# Patient Record
Sex: FEMALE | Race: OTHER | HISPANIC OR LATINO
[De-identification: names, ages, dates, MRNs, and addresses within clinical notes are randomized per-mention and may not be internally consistent; named-entity substitution may affect disease eponyms.]

---

## 2023-08-05 ENCOUNTER — ASOB RESULT (OUTPATIENT)
Age: 38
End: 2023-08-05

## 2023-08-05 ENCOUNTER — APPOINTMENT (OUTPATIENT)
Dept: ANTEPARTUM | Facility: CLINIC | Age: 38
End: 2023-08-05
Payer: COMMERCIAL

## 2023-08-05 ENCOUNTER — OUTPATIENT (OUTPATIENT)
Dept: INPATIENT UNIT | Facility: HOSPITAL | Age: 38
LOS: 1 days | Discharge: HOME | End: 2023-08-05
Payer: COMMERCIAL

## 2023-08-05 VITALS
DIASTOLIC BLOOD PRESSURE: 68 MMHG | SYSTOLIC BLOOD PRESSURE: 107 MMHG | TEMPERATURE: 98 F | HEART RATE: 75 BPM | RESPIRATION RATE: 16 BRPM

## 2023-08-05 VITALS — DIASTOLIC BLOOD PRESSURE: 64 MMHG | SYSTOLIC BLOOD PRESSURE: 108 MMHG | HEART RATE: 67 BPM

## 2023-08-05 DIAGNOSIS — O26.899 OTHER SPECIFIED PREGNANCY RELATED CONDITIONS, UNSPECIFIED TRIMESTER: ICD-10-CM

## 2023-08-05 PROCEDURE — 76815 OB US LIMITED FETUS(S): CPT | Mod: 26

## 2023-08-05 PROCEDURE — 76819 FETAL BIOPHYS PROFIL W/O NST: CPT | Mod: 26

## 2023-08-05 PROCEDURE — 83986 ASSAY PH BODY FLUID NOS: CPT | Mod: QW

## 2023-08-05 PROCEDURE — 99221 1ST HOSP IP/OBS SF/LOW 40: CPT | Mod: 25

## 2023-08-05 PROCEDURE — 59025 FETAL NON-STRESS TEST: CPT | Mod: 26

## 2023-08-05 NOTE — OB RN TRIAGE NOTE - NS_OBGYNHISTORY_OBGYN_ALL_OB_FT
[facility] 2022
PAST SURGICAL HISTORY:  No significant past surgical history

## 2023-08-05 NOTE — OB PROVIDER TRIAGE NOTE - NSHPPHYSICALEXAM_GEN_ALL_CORE
Vital Signs Last 24 Hrs  T(C): 36.7 (05 Aug 2023 09:53), Max: 36.7 (05 Aug 2023 09:53)  T(F): 98.1 (05 Aug 2023 09:53), Max: 98.1 (05 Aug 2023 09:53)  HR: 69 (05 Aug 2023 10:09) (69 - 75)  BP: 105/61 (05 Aug 2023 10:09) (105/61 - 107/68)  BP(mean): --  RR: 16 (05 Aug 2023 09:53) (16 - 16)  SpO2: --    abdomen soft, non tender  HR reg rate, rhythm  lungs clear, equal b/l  nst:  toco: irregular  sse: negative pooling, negative nitrazine, negative fern. scant brown spotting noted.  sve: 0/30/-3  tas: Vital Signs Last 24 Hrs  T(C): 36.7 (05 Aug 2023 09:53), Max: 36.7 (05 Aug 2023 09:53)  T(F): 98.1 (05 Aug 2023 09:53), Max: 98.1 (05 Aug 2023 09:53)  HR: 69 (05 Aug 2023 10:09) (69 - 75)  BP: 105/61 (05 Aug 2023 10:09) (105/61 - 107/68)  BP(mean): --  RR: 16 (05 Aug 2023 09:53) (16 - 16)  SpO2: --    abdomen soft, non tender  HR reg rate, rhythm  lungs clear, equal b/l  nst: 135bpm, moderate variability, +accels, no decels. reactive  toco: irregular  sse: negative pooling, negative nitrazine, negative fern. scant brown spotting noted.  sve: 0/30/-3  tas: images saved in asob, vertex presentation, anterior placenta, bpp 8/8, alaina 12.16, m-mode 121

## 2023-08-05 NOTE — OB PROVIDER TRIAGE NOTE - HISTORY OF PRESENT ILLNESS
PNC: Dr. Moreno    36y/o  at 38.5weeks presents with c/o lof since 830p last night (), with scant brown spotting. Denies cramping/ctx's, vb and reports positive fm.      GBS neg     AP Course significant for:  -AMA PNC: Dr. Moreno    38y/o  at 38.5weeks presents with c/o lof since 830p last night (), with scant brown spotting. Denies cramping/ctx's, vb and reports positive fm.      GBS neg     AP Course significant for:  -AMA  -anxiety/depression (saw therapist)- reports feeling well.

## 2023-08-05 NOTE — OB PROVIDER TRIAGE NOTE - ADDITIONAL INSTRUCTIONS
38y/o  at 38.5weeks with no evidence of rupture of membranes.  -maternal and fetal status reassuring.    - Discussed with Dr. Morales  - Patient to be discharged home with follow up and return precautions  - Please follow up with your obstetrician at your next scheduled appointment, as indicated.  - Please return for decreased/no fetal movement, vaginal bleeding similar to that of a period, leaking/gush of fluid, regular contractions.  - Patient and partner and educated of plan and demonstrate understanding. All questions answered. Discharge instructions provided and signed.   - Discharged at 1105.

## 2023-08-05 NOTE — OB PROVIDER TRIAGE NOTE - NSOBPROVIDERNOTE_OBGYN_ALL_OB_FT
36y/o  at 38.5weeks ruling out rupture of membranes. 38y/o  at 38.5weeks ruling out rupture of membranes.  -BPP 8/8 with reactive nst.  -CRISTHIAN 12.16    38y/o  at 38.5weeks with no evidence of rupture of membranes.  -maternal and fetal status reassuring.    - Discussed with Dr. Morales  - Patient to be discharged home with follow up and return precautions  - Please follow up with your obstetrician at your next scheduled appointment, as indicated.  - Please return for decreased/no fetal movement, vaginal bleeding similar to that of a period, leaking/gush of fluid, regular contractions.  - Patient and partner and educated of plan and demonstrate understanding. All questions answered. Discharge instructions provided and signed.   - Discharged at 1105.

## 2023-08-07 DIAGNOSIS — Z3A.38 38 WEEKS GESTATION OF PREGNANCY: ICD-10-CM

## 2023-08-07 DIAGNOSIS — O09.293 SUPERVISION OF PREGNANCY WITH OTHER POOR REPRODUCTIVE OR OBSTETRIC HISTORY, THIRD TRIMESTER: ICD-10-CM

## 2023-08-07 DIAGNOSIS — O09.523 SUPERVISION OF ELDERLY MULTIGRAVIDA, THIRD TRIMESTER: ICD-10-CM

## 2023-08-07 DIAGNOSIS — F41.8 OTHER SPECIFIED ANXIETY DISORDERS: ICD-10-CM

## 2023-08-07 DIAGNOSIS — O99.343 OTHER MENTAL DISORDERS COMPLICATING PREGNANCY, THIRD TRIMESTER: ICD-10-CM

## 2023-08-07 DIAGNOSIS — O26.853 SPOTTING COMPLICATING PREGNANCY, THIRD TRIMESTER: ICD-10-CM

## 2023-08-07 DIAGNOSIS — Z03.71 ENCOUNTER FOR SUSPECTED PROBLEM WITH AMNIOTIC CAVITY AND MEMBRANE RULED OUT: ICD-10-CM

## 2023-08-08 ENCOUNTER — APPOINTMENT (OUTPATIENT)
Dept: ANTEPARTUM | Facility: CLINIC | Age: 38
End: 2023-08-08

## 2023-08-08 ENCOUNTER — INPATIENT (INPATIENT)
Facility: HOSPITAL | Age: 38
LOS: 3 days | Discharge: HOME | End: 2023-08-12
Attending: OBSTETRICS & GYNECOLOGY | Admitting: OBSTETRICS & GYNECOLOGY
Payer: SELF-PAY

## 2023-08-08 VITALS
SYSTOLIC BLOOD PRESSURE: 110 MMHG | HEART RATE: 82 BPM | TEMPERATURE: 99 F | DIASTOLIC BLOOD PRESSURE: 59 MMHG | RESPIRATION RATE: 16 BRPM

## 2023-08-08 DIAGNOSIS — O26.899 OTHER SPECIFIED PREGNANCY RELATED CONDITIONS, UNSPECIFIED TRIMESTER: ICD-10-CM

## 2023-08-08 LAB
BASOPHILS # BLD AUTO: 0.04 K/UL — SIGNIFICANT CHANGE UP (ref 0–0.2)
BASOPHILS NFR BLD AUTO: 0.3 % — SIGNIFICANT CHANGE UP (ref 0–2)
BLD GP AB SCN SERPL QL: NEGATIVE — SIGNIFICANT CHANGE UP
EOSINOPHIL # BLD AUTO: 0.13 K/UL — SIGNIFICANT CHANGE UP (ref 0–0.5)
EOSINOPHIL NFR BLD AUTO: 1 % — SIGNIFICANT CHANGE UP (ref 0–6)
HCT VFR BLD CALC: 32 % — LOW (ref 34.5–45)
HGB BLD-MCNC: 10.2 G/DL — LOW (ref 11.5–15.5)
IANC: 10.7 K/UL — HIGH (ref 1.8–7.4)
IMM GRANULOCYTES NFR BLD AUTO: 1.1 % — HIGH (ref 0–0.9)
LYMPHOCYTES # BLD AUTO: 1.48 K/UL — SIGNIFICANT CHANGE UP (ref 1–3.3)
LYMPHOCYTES # BLD AUTO: 11.2 % — LOW (ref 13–44)
MCHC RBC-ENTMCNC: 24.1 PG — LOW (ref 27–34)
MCHC RBC-ENTMCNC: 31.9 GM/DL — LOW (ref 32–36)
MCV RBC AUTO: 75.7 FL — LOW (ref 80–100)
MONOCYTES # BLD AUTO: 0.67 K/UL — SIGNIFICANT CHANGE UP (ref 0–0.9)
MONOCYTES NFR BLD AUTO: 5.1 % — SIGNIFICANT CHANGE UP (ref 2–14)
NEUTROPHILS # BLD AUTO: 10.7 K/UL — HIGH (ref 1.8–7.4)
NEUTROPHILS NFR BLD AUTO: 81.3 % — HIGH (ref 43–77)
NRBC # BLD: 0 /100 WBCS — SIGNIFICANT CHANGE UP (ref 0–0)
NRBC # FLD: 0 K/UL — SIGNIFICANT CHANGE UP (ref 0–0)
PLATELET # BLD AUTO: 304 K/UL — SIGNIFICANT CHANGE UP (ref 150–400)
RBC # BLD: 4.23 M/UL — SIGNIFICANT CHANGE UP (ref 3.8–5.2)
RBC # FLD: 15.5 % — HIGH (ref 10.3–14.5)
RH IG SCN BLD-IMP: POSITIVE — SIGNIFICANT CHANGE UP
RH IG SCN BLD-IMP: POSITIVE — SIGNIFICANT CHANGE UP
WBC # BLD: 13.16 K/UL — HIGH (ref 3.8–10.5)
WBC # FLD AUTO: 13.16 K/UL — HIGH (ref 3.8–10.5)

## 2023-08-08 RX ORDER — CITRIC ACID/SODIUM CITRATE 300-500 MG
15 SOLUTION, ORAL ORAL EVERY 6 HOURS
Refills: 0 | Status: DISCONTINUED | OUTPATIENT
Start: 2023-08-08 | End: 2023-08-08

## 2023-08-08 RX ORDER — MORPHINE SULFATE 50 MG/1
4 CAPSULE, EXTENDED RELEASE ORAL ONCE
Refills: 0 | Status: DISCONTINUED | OUTPATIENT
Start: 2023-08-08 | End: 2023-08-08

## 2023-08-08 RX ORDER — CITRIC ACID/SODIUM CITRATE 300-500 MG
15 SOLUTION, ORAL ORAL EVERY 6 HOURS
Refills: 0 | Status: DISCONTINUED | OUTPATIENT
Start: 2023-08-08 | End: 2023-08-09

## 2023-08-08 RX ORDER — OXYTOCIN 10 UNIT/ML
333.33 VIAL (ML) INJECTION
Qty: 20 | Refills: 0 | Status: DISCONTINUED | OUTPATIENT
Start: 2023-08-08 | End: 2023-08-09

## 2023-08-08 RX ORDER — CHLORHEXIDINE GLUCONATE 213 G/1000ML
1 SOLUTION TOPICAL DAILY
Refills: 0 | Status: DISCONTINUED | OUTPATIENT
Start: 2023-08-08 | End: 2023-08-08

## 2023-08-08 RX ORDER — SODIUM CHLORIDE 9 MG/ML
1000 INJECTION, SOLUTION INTRAVENOUS
Refills: 0 | Status: DISCONTINUED | OUTPATIENT
Start: 2023-08-08 | End: 2023-08-09

## 2023-08-08 RX ORDER — CHLORHEXIDINE GLUCONATE 213 G/1000ML
1 SOLUTION TOPICAL DAILY
Refills: 0 | Status: DISCONTINUED | OUTPATIENT
Start: 2023-08-08 | End: 2023-08-09

## 2023-08-08 RX ORDER — SODIUM CHLORIDE 9 MG/ML
1000 INJECTION, SOLUTION INTRAVENOUS
Refills: 0 | Status: DISCONTINUED | OUTPATIENT
Start: 2023-08-08 | End: 2023-08-08

## 2023-08-08 RX ORDER — OXYTOCIN 10 UNIT/ML
333.33 VIAL (ML) INJECTION
Qty: 20 | Refills: 0 | Status: DISCONTINUED | OUTPATIENT
Start: 2023-08-08 | End: 2023-08-08

## 2023-08-08 RX ADMIN — MORPHINE SULFATE 4 MILLIGRAM(S): 50 CAPSULE, EXTENDED RELEASE ORAL at 17:32

## 2023-08-08 RX ADMIN — MORPHINE SULFATE 4 MILLIGRAM(S): 50 CAPSULE, EXTENDED RELEASE ORAL at 17:50

## 2023-08-08 RX ADMIN — MORPHINE SULFATE 4 MILLIGRAM(S): 50 CAPSULE, EXTENDED RELEASE ORAL at 21:20

## 2023-08-08 RX ADMIN — MORPHINE SULFATE 4 MILLIGRAM(S): 50 CAPSULE, EXTENDED RELEASE ORAL at 21:30

## 2023-08-08 RX ADMIN — CHLORHEXIDINE GLUCONATE 1 APPLICATION(S): 213 SOLUTION TOPICAL at 17:21

## 2023-08-08 RX ADMIN — MORPHINE SULFATE 4 MILLIGRAM(S): 50 CAPSULE, EXTENDED RELEASE ORAL at 21:16

## 2023-08-08 NOTE — OB PROVIDER H&P - NSHPPHYSICALEXAM_GEN_ALL_CORE
Vitals: ICU Vital Signs Last 24 Hrs  T(C): 37 (08 Aug 2023 14:38), Max: 37 (08 Aug 2023 14:38)  T(F): 98.6 (08 Aug 2023 14:38), Max: 98.6 (08 Aug 2023 14:38)  HR: 67 (08 Aug 2023 15:16) (67 - 82)  BP: 98/55 (08 Aug 2023 15:16) (98/55 - 110/59)  BP(mean): --  ABP: --  ABP(mean): --  RR: 16 (08 Aug 2023 14:38) (16 - 16)  SpO2: --    O2 Parameters below as of 08 Aug 2023 14:38  Patient On (Oxygen Delivery Method): room air    Gen: NAD, A+O x 3, resting comfortably  Resp: CTAB  Cardio: RRR  Abd: Gravid, soft, non-distended, non-tender to superficial and deep palpation in all 4 quadrants, no rebound/guarding  Ext: Warm, well perfused, 1+ nonpitting edema bilaterally    EFM: 135 bpm, moderate variability with spontaneous accelerations, no decelerations, Reactive NST  [place]: Contractions every 4-6 minutes    SSE: deferred  SVE: 1/70/-3

## 2023-08-08 NOTE — OB PROVIDER LABOR PROGRESS NOTE - ASSESSMENT
@39.1wks Labor  Cervical change noted, SROM confirmed on exam- clear  Cont with expectant management at this time  Cont EFM/TOCO  Epidural PRN  Will reassess PRN    margarito Dodson NP

## 2023-08-08 NOTE — OB PROVIDER H&P - GRAVIDA, OB PROFILE
Total Pulses (Will Not Render If 0): 0 Location #1: Belly, chest Total Square Area In Cm2 (Required For Proper Billing): 492 Dose Settinge #4 (Mj/Cm2): 0653 Treatment Number: 88 Dose Setting #2 (Mj/Cm2): 2376 Detail Level: Detailed 2 Post-Care Instructions: I reviewed with the patient in detail post-care instructions. Patient should stay away from the sun and wear sun protection until treated areas are fully healed. Location #3: Arms Consent: Written consent obtained, risks reviewed including but not limited to crusting, scabbing, blistering, scarring, darker or lighter pigmentary change, incidental hair removal, bruising, and/or incomplete removal. Location #4: Hands, feet % Increase/Decrease From Last Treatment: 50+ Dose Setting #1 (Mj/Cm2): 0211 Total Energy In Joules: 1468.60 Dose Setting #3 (Mj/Cm2): 2900 Location #2: Back

## 2023-08-08 NOTE — OB PROVIDER H&P - HISTORY OF PRESENT ILLNESS
37 year old  @ 39.1 weeks, MANNY 2023 dated by LMP, consistent with 1st Trimester US, presents to L&D complaining of painful contractions every 4-6 minutes x 1 hour, with pain /10, with irregular contractions since yesterday. Patient states she lost her mucus plug, and she has been irregularly cooper every 30 minutes, and have been increasing in frequency and intensity. Patient admits to normal fetal movement. Denies vaginal bleeding, leakage of fluid, painful contractions/lower abdominal cramping, fever/chills, shortness of breath,  chest pain, increased swelling, difficulty ambulating, loss of taste/smell, nausea/vomiting/diarrhea, rash, weakness, paresthesia, change in appetite, dizziness, lightheadedness, cough, nasal congestion, runny nose    Antepartum Course:  1. AMA   2. History of depression

## 2023-08-08 NOTE — OB PROVIDER TRIAGE NOTE - NS_OBGYNHISTORY_OBGYN_ALL_OB_FT
OB History: : SAB, complete  G2: Current pregnancy, AMA      - Denies HTN/DM/fetal issues    Prenatal Labs Reviewed:  T&S: A+  Rubella: Equivocal  Hep B: Neg  HIV: Neg  RPR: Neg  GCT: 97  G/C: Neg  GBS: Neg      Ultrasounds: not found in chart    GYN Hx: Denies fibroids, ovarian cysts, HSV/ STDs, abnormal pap smears

## 2023-08-08 NOTE — OB RN TRIAGE NOTE - NS_SISCREENINGSR_GEN_ALL_ED
· Macario for review and signature.  
Negative

## 2023-08-08 NOTE — OB RN PATIENT PROFILE - ALERT: PERTINENT HISTORY
1st Trimester Sonogram/20 Week Level II Sonogram/BioPhysical Profile(s)/Chorionic Villus Sampling ([facility])/Non Invasive Prenatal Screen (NIPS)/Fetal Non-Stress Test (NST)/Ultra Screen at 12 Weeks

## 2023-08-08 NOTE — OB PROVIDER H&P - NSLOWPPHRISK_OBGYN_A_OB
No previous uterine incision/Dolan Pregnancy/Less than or equal to 4 previous vaginal births/No known bleeding disorder/No history of postpartum hemorrhage/No other PPH risks indicated

## 2023-08-08 NOTE — OB PROVIDER TRIAGE NOTE - HISTORY OF PRESENT ILLNESS
37 year old  @ 39.1 weeks, MANNY 2023 dated by LMP, consistent with 1st Trimester US, presents to L&D complaining of painful contractions every 4-6 minutes x 1 hour, with pain 7/10, with irregular contractions since yesterday. Patient states she lost her mucus plug, and she has been irregularly cooper every 30 minutes, and have been increasing in frequency and intensity. Patient admits to normal fetal movement. Denies vaginal bleeding, leakage of fluid, painful contractions/lower abdominal cramping, fever/chills, shortness of breath,  chest pain, increased swelling, difficulty ambulating, loss of taste/smell, nausea/vomiting/diarrhea, rash, weakness, paresthesia, change in appetite, dizziness, lightheadedness, cough, nasal congestion, runny nose    Antepartum Course:  1. AMA   2. History of depression                Vitals: ICU Vital Signs Last 24 Hrs  T(C): 37 (08 Aug 2023 14:38), Max: 37 (08 Aug 2023 14:38)  T(F): 98.6 (08 Aug 2023 14:38), Max: 98.6 (08 Aug 2023 14:38)  HR: 67 (08 Aug 2023 15:16) (67 - 82)  BP: 98/55 (08 Aug 2023 15:16) (98/55 - 110/59)  BP(mean): --  ABP: --  ABP(mean): --  RR: 16 (08 Aug 2023 14:38) (16 - 16)  SpO2: --    O2 Parameters below as of 08 Aug 2023 14:38  Patient On (Oxygen Delivery Method): room air    Gen: NAD, A+O x 3, resting comfortably  Resp: CTAB  Cardio: RRR  Abd: Gravid, soft, non-distended, non-tender to superficial and deep palpation in all 4 quadrants, no rebound/guarding  Ext: Warm, well perfused, 1+ nonpitting edema bilaterally    EFM: 135 bpm, moderate variability with spontaneous accelerations, no decelerations, Reactive NST  [place]: Contractions every 4-6 minutes    SSE: deferred  SVE: /-3

## 2023-08-08 NOTE — OB PROVIDER H&P - ALERT: PERTINENT HISTORY
1st Trimester Sonogram/20 Week Level II Sonogram/BioPhysical Profile(s)/Chorionic Villus Sampling ([facility]S)/Fetal Non-Stress Test (NST)

## 2023-08-08 NOTE — OB PROVIDER H&P - ASSESSMENT
A&P: 37 year old  @ 39.1 weeks, admitted to L&D for early labor management and pain management, SVE /3, with Reactive NST, with irregular contractions,  GBS Neg, vital signs stable  - Admit to L&D  - Plan for Morphine IV for pain management at this time  - Clear Liquid Diet  - Continuous EFM/toco  - IV access, CBC/T&C/RPR  - Anesthesia consult   - Re-evaluate SVE in 4 hours or sooner if clinically indicated, if no change or if contraction pattern spaces then initiate augmentation agent   - Educated and discussed with patient the assessment and plan, pt verbalized understanding and agreement with assessment and plan, all questions answered  - Discussed with Dr. Mccauley

## 2023-08-08 NOTE — OB PROVIDER TRIAGE NOTE - GRAVIDA, OB PROFILE
2 Staged Advancement Flap Text: The defect edges were debeveled with a #15 scalpel blade. Given the location of the defect, shape of the defect and the proximity to free margins a staged advancement flap was deemed most appropriate. Using a sterile surgical marker, an appropriate advancement flap was drawn incorporating the defect and placing the expected incisions within the relaxed skin tension lines where possible. The area thus outlined was incised deep to adipose tissue with a #15 scalpel blade. The skin margins were undermined to an appropriate distance in all directions utilizing iris scissors. Following this, the designed flap was carried over into the primary defect and sutured into place.

## 2023-08-08 NOTE — OB PROVIDER TRIAGE NOTE - NSOBPROVIDERNOTE_OBGYN_ALL_OB_FT
A&P: A&P: 37 year old  @ 39.1 weeks, admitted to L&D for early labor management and pain management, SVE /3, with Reactive NST, with irregular contractions,  GBS Neg, vital signs stable  - Admit to L&D  - Plan for Morphine IV for pain management at this time  - Clear Liquid Diet  - Continuous EFM/toco  - IV access, CBC/T&C/RPR  - Anesthesia consult   - Re-evaluate SVE in 4 hours or sooner if clinically indicated, if no change or if contraction pattern spaces then initiate augmentation agent   - Educated and discussed with patient the assessment and plan, pt verbalized understanding and agreement with assessment and plan, all questions answered  - Discussed with Dr. Mccauley

## 2023-08-08 NOTE — OB PROVIDER TRIAGE NOTE - NSHPPHYSICALEXAM_GEN_ALL_CORE
Psych: Admits to history of depression, with no medications, was followed by therapist, currently feels fine, denies SI/HI, Denies anxiety, or other mental health disease    Social: Denies cigarette/tobacco/alcohol/illicit drug use

## 2023-08-08 NOTE — OB RN TRIAGE NOTE - FALL HARM RISK - UNIVERSAL INTERVENTIONS
Bed in lowest position, wheels locked, appropriate side rails in place/Call bell, personal items and telephone in reach/Instruct patient to call for assistance before getting out of bed or chair/Non-slip footwear when patient is out of bed/[place] to call system/Physically safe environment - no spills, clutter or unnecessary equipment/Purposeful Proactive Rounding/Room/bathroom lighting operational, light cord in reach
acceptance

## 2023-08-09 ENCOUNTER — TRANSCRIPTION ENCOUNTER (OUTPATIENT)
Age: 38
End: 2023-08-09

## 2023-08-09 LAB — T PALLIDUM AB TITR SER: NEGATIVE — SIGNIFICANT CHANGE UP

## 2023-08-09 RX ORDER — BENZOCAINE 10 %
1 GEL (GRAM) MUCOUS MEMBRANE EVERY 6 HOURS
Refills: 0 | Status: DISCONTINUED | OUTPATIENT
Start: 2023-08-09 | End: 2023-08-12

## 2023-08-09 RX ORDER — ACETAMINOPHEN 500 MG
975 TABLET ORAL
Refills: 0 | Status: DISCONTINUED | OUTPATIENT
Start: 2023-08-09 | End: 2023-08-12

## 2023-08-09 RX ORDER — LANOLIN
1 OINTMENT (GRAM) TOPICAL EVERY 6 HOURS
Refills: 0 | Status: DISCONTINUED | OUTPATIENT
Start: 2023-08-09 | End: 2023-08-12

## 2023-08-09 RX ORDER — DIBUCAINE 1 %
1 OINTMENT (GRAM) RECTAL EVERY 6 HOURS
Refills: 0 | Status: DISCONTINUED | OUTPATIENT
Start: 2023-08-09 | End: 2023-08-12

## 2023-08-09 RX ORDER — HYDROCORTISONE 1 %
1 OINTMENT (GRAM) TOPICAL EVERY 6 HOURS
Refills: 0 | Status: DISCONTINUED | OUTPATIENT
Start: 2023-08-09 | End: 2023-08-12

## 2023-08-09 RX ORDER — DIPHENHYDRAMINE HCL 50 MG
25 CAPSULE ORAL EVERY 6 HOURS
Refills: 0 | Status: DISCONTINUED | OUTPATIENT
Start: 2023-08-09 | End: 2023-08-12

## 2023-08-09 RX ORDER — PRAMOXINE HYDROCHLORIDE 150 MG/15G
1 AEROSOL, FOAM RECTAL EVERY 4 HOURS
Refills: 0 | Status: DISCONTINUED | OUTPATIENT
Start: 2023-08-09 | End: 2023-08-12

## 2023-08-09 RX ORDER — TETANUS TOXOID, REDUCED DIPHTHERIA TOXOID AND ACELLULAR PERTUSSIS VACCINE, ADSORBED 5; 2.5; 8; 8; 2.5 [IU]/.5ML; [IU]/.5ML; UG/.5ML; UG/.5ML; UG/.5ML
0.5 SUSPENSION INTRAMUSCULAR ONCE
Refills: 0 | Status: DISCONTINUED | OUTPATIENT
Start: 2023-08-09 | End: 2023-08-12

## 2023-08-09 RX ORDER — IBUPROFEN 200 MG
600 TABLET ORAL EVERY 6 HOURS
Refills: 0 | Status: DISCONTINUED | OUTPATIENT
Start: 2023-08-09 | End: 2023-08-12

## 2023-08-09 RX ORDER — OXYTOCIN 10 UNIT/ML
2 VIAL (ML) INJECTION
Qty: 30 | Refills: 0 | Status: DISCONTINUED | OUTPATIENT
Start: 2023-08-09 | End: 2023-08-10

## 2023-08-09 RX ORDER — AER TRAVELER 0.5 G/1
1 SOLUTION RECTAL; TOPICAL EVERY 4 HOURS
Refills: 0 | Status: DISCONTINUED | OUTPATIENT
Start: 2023-08-09 | End: 2023-08-12

## 2023-08-09 RX ORDER — KETOROLAC TROMETHAMINE 30 MG/ML
30 SYRINGE (ML) INJECTION ONCE
Refills: 0 | Status: DISCONTINUED | OUTPATIENT
Start: 2023-08-09 | End: 2023-08-11

## 2023-08-09 RX ORDER — OXYCODONE HYDROCHLORIDE 5 MG/1
5 TABLET ORAL
Refills: 0 | Status: DISCONTINUED | OUTPATIENT
Start: 2023-08-09 | End: 2023-08-12

## 2023-08-09 RX ORDER — IBUPROFEN 200 MG
600 TABLET ORAL EVERY 6 HOURS
Refills: 0 | Status: COMPLETED | OUTPATIENT
Start: 2023-08-09 | End: 2024-07-07

## 2023-08-09 RX ORDER — OXYCODONE HYDROCHLORIDE 5 MG/1
5 TABLET ORAL ONCE
Refills: 0 | Status: DISCONTINUED | OUTPATIENT
Start: 2023-08-09 | End: 2023-08-12

## 2023-08-09 RX ORDER — SODIUM CHLORIDE 9 MG/ML
3 INJECTION INTRAMUSCULAR; INTRAVENOUS; SUBCUTANEOUS EVERY 8 HOURS
Refills: 0 | Status: DISCONTINUED | OUTPATIENT
Start: 2023-08-09 | End: 2023-08-12

## 2023-08-09 RX ORDER — OXYTOCIN 10 UNIT/ML
41.67 VIAL (ML) INJECTION
Qty: 20 | Refills: 0 | Status: DISCONTINUED | OUTPATIENT
Start: 2023-08-09 | End: 2023-08-10

## 2023-08-09 RX ORDER — MAGNESIUM HYDROXIDE 400 MG/1
30 TABLET, CHEWABLE ORAL
Refills: 0 | Status: DISCONTINUED | OUTPATIENT
Start: 2023-08-09 | End: 2023-08-12

## 2023-08-09 RX ORDER — SIMETHICONE 80 MG/1
80 TABLET, CHEWABLE ORAL EVERY 4 HOURS
Refills: 0 | Status: DISCONTINUED | OUTPATIENT
Start: 2023-08-09 | End: 2023-08-12

## 2023-08-09 RX ADMIN — Medication 975 MILLIGRAM(S): at 21:10

## 2023-08-09 RX ADMIN — Medication 975 MILLIGRAM(S): at 20:31

## 2023-08-09 RX ADMIN — Medication 600 MILLIGRAM(S): at 18:04

## 2023-08-09 RX ADMIN — Medication 600 MILLIGRAM(S): at 17:34

## 2023-08-09 RX ADMIN — Medication 125 MILLIUNIT(S)/MIN: at 13:21

## 2023-08-09 RX ADMIN — Medication 2 MILLIUNIT(S)/MIN: at 05:17

## 2023-08-09 RX ADMIN — SODIUM CHLORIDE 3 MILLILITER(S): 9 INJECTION INTRAMUSCULAR; INTRAVENOUS; SUBCUTANEOUS at 21:10

## 2023-08-09 NOTE — DISCHARGE NOTE OB - MEDICATION SUMMARY - MEDICATIONS TO TAKE
I will START or STAY ON the medications listed below when I get home from the hospital:  None I will START or STAY ON the medications listed below when I get home from the hospital:    ibuprofen 600 mg oral tablet  -- 1 tab(s) by mouth every 6 hours  -- Indication: For pain    acetaminophen 325 mg oral tablet  -- 3 tab(s) by mouth every 6 hours  -- Indication: For pain    Prenatal Multivitamins with Folic Acid 1 mg oral tablet  -- 1 tab(s) by mouth once a day  -- Indication: For supplement

## 2023-08-09 NOTE — OB PROVIDER DELIVERY SUMMARY - NSPROVIDERDELIVERYNOTE_OBGYN_ALL_OB_FT
Patient was fully dilated and pushing. Fetal head was MICHELLE and head delivered without complication. Loose CAN x2 reduced. The anterior and posterior shoulders delivered, followed by the remaining body atraumatically. Innis emerged vigorous and crying. Delayed cord clamping was performed, and then clamped and cut. Cord blood gases collected x2. The  was placed skin to skin with mother then passed to awaiting pediatric team. The placenta delivered intact with membranes. Pitocin was administered. Uterus massaged, fundus found to be firm. Cervix, vagina and perineum inspected revealing 2nd degree laceration, repaired using 2-0 chromic in the usual fashion with good hemostasis.     Viable female infant delivered, with APGARs 8/9    Lacteration: 2nd      Dr. Arnold present for the delivery  Amisha Rowley [facility]

## 2023-08-09 NOTE — OB NEONATOLOGY/PEDIATRICIAN DELIVERY SUMMARY - NSPEDSNEONOTESA_OBGYN_ALL_OB_FT
Called by OBGYN to attend  due to Cat II tracing. Baby is product of a 39+2 week gestation born to a  37 y.o. F. Maternal labs include Blood Type A+, HIV neg, RPR NR, Hep B neg, GBS neg on , rubella equivocal. Maternal history is significant for depression and anxiety. AROM on  at 1830 with clear fluid. Delivery complicated by terminal meconium. Baby emerged crying and mildy vigorous.  Delayed cord clamping x30s performed. Infant was brought to radiant warmer and warmed, dried, stimulated and deep suctioned a lot of meconium. HR>100, CPAP was started for grunting, nasal flaring and retractions, after 10 min with max settings of 5/21, baby was successfully trialed off. APGARS of 8/9 were given for tone and color. Highest maternal temperature 37.1, EOS score: 0.20. Admit to NBN, baby stable for transfer. Mom plans to initiate breastfeeding, and consents to Hep B vaccine.    BW: 3720 g (LGA)  : 23  TOB: 1147

## 2023-08-09 NOTE — DISCHARGE NOTE OB - PATIENT PORTAL LINK FT
You can access the FollowMyHealth Patient Portal offered by [facility] by registering at the following website: [email or web address]. By joining [employer]’s FollowMyHealth portal, you will also be able to view your health information using other applications (apps) compatible with our system.

## 2023-08-09 NOTE — DISCHARGE NOTE OB - CARE PROVIDER_API CALL
Felipa Mccauley  Obstetrics and Gynecology  [address], [address]  [place] [address]  Phone: [phone]  Fax: [phone]  Follow Up Time:

## 2023-08-09 NOTE — OB RN DELIVERY SUMMARY - NS_SEPSISRSKCALC_OBGYN_ALL_OB_FT
I spoke with pt and rescheduled her MBB from 8/28/19 to 9/16/19.   EOS calculated successfully. EOS Risk Factor: 0.5/1000 live births ([facility] national incidence); GA=39w2d; Temp=98.8; ROM=5.5; GBS='Negative'; Antibiotics='No antibiotics or any antibiotics < 2 hrs prior to birth'   EOS calculated successfully. EOS Risk Factor: 0.5/1000 live births ([facility] national incidence); GA=39w2d; Temp=98.8; ROM=17.283; GBS='Negative'; Antibiotics='No antibiotics or any antibiotics < 2 hrs prior to birth'

## 2023-08-09 NOTE — OB NEONATOLOGY/PEDIATRICIAN DELIVERY SUMMARY - BABY A: APGAR 5 MIN SCORE, DELIVERY
The access site was successfully closed using a (HEMOSTAT CMPR VASC REG 24CM) closure device. 11cc air in TR Band 9

## 2023-08-09 NOTE — CHART NOTE - NSCHARTNOTEFT_GEN_A_CORE
PTA called for 2hrs of being fully dilated. Amrik Manuel CNM, RN Avery & charge RN Harriett FERNANDES present at bedside.     Pt made progress from -2 to +3.   Fetus rotated from OP to MICHELLE position.   Cat 2 tracing- variables with moderate variability.     Plan  -continue pushing  -anticipate     Amisha Rowley CNM

## 2023-08-09 NOTE — DISCHARGE NOTE OB - NS MD DC FALL RISK RISK
For information on Fall & Injury Prevention, visit: [email or web address] OR  [email or web address] OR  https://www.cdc.gov/steadi/patient.html

## 2023-08-09 NOTE — OB PROVIDER LABOR PROGRESS NOTE - NS_OBIHIFHRDETAILS_OBGYN_ALL_OB_FT
Labor augmentation at term, overall reassuring fetal status  Reposition  Reeval in 2-4 hrs/prn  Dr Arnold updated  Darwin GALLO
125 mod noemi/+accels/-decels
140 mod noemi/+accels/-decels

## 2023-08-09 NOTE — DISCHARGE NOTE OB - WEAR SUPPORTIVE BRA
Price (Do Not Change): 0.00 Instructions: This plan will send the code FBSE to the PM system.  DO NOT or CHANGE the price. Detail Level: Generalized Statement Selected

## 2023-08-09 NOTE — OB RN DELIVERY SUMMARY - NSSELHIDDEN_OBGYN_ALL_OB_FT
[NS_DeliveryAttending1_OBGYN_ALL_OB_FT:[id number]],[NS_DeliveryAssist1_OBGYN_ALL_OB_FT:[id number]],[NS_DeliveryRN_OBGYN_ALL_OB_FT:[id number]=]

## 2023-08-09 NOTE — OB PROVIDER LABOR PROGRESS NOTE - ASSESSMENT
@39.2wks Labor  sp SROM @630p  Minimal cervical change noted  For pitocin augmentation   Cont with EFM/TOCO  Anticipate   Will reassess PRN    margarito Dodson NP

## 2023-08-09 NOTE — OB PROVIDER DELIVERY SUMMARY - NSSELHIDDEN_OBGYN_ALL_OB_FT
[NS_DeliveryAttending1_OBGYN_ALL_OB_FT:[id number]],[NS_DeliveryAssist1_OBGYN_ALL_OB_FT:[id number]=]

## 2023-08-09 NOTE — DISCHARGE NOTE OB - HOSPITAL COURSE
patient presented 4 cm, pitocin augmentation   live  patient presented 4 cm, pitocin augmentation   live infant  PP course complicated by symptoms of SOB.  TTE, normal.  CTPA negative for PE.  LE dopplers negative for DVT.  Pt's symptoms resolved prior to discharge.

## 2023-08-09 NOTE — OB PROVIDER LABOR PROGRESS NOTE - NS_SUBJECTIVE/OBJECTIVE_OBGYN_ALL_OB_FT
Pt seen and examined for labor progress, comfortable with epidural  Pitocin @ 4 mu/min  , loss of contact noted  [place]: 2-3  VE: 7.5/100/0
Patient seen and examined to assess for cervical change. Effective epidural in place. Patient comfortable.  VS  T(C): 36.8 (08-09-23 @ 02:30)  HR: 61 (08-09-23 @ 05:09)  BP: 97/55 (08-09-23 @ 05:06)  RR: 17 (08-09-23 @ 02:30)  SpO2: 98% (08-09-23 @ 05:09)
Patient seen and examined to assess for cervical change. Patient declining pain management at this time. Patient reports SROM @630pm.  VS  T(C): 37.1 (08-08-23 @ 16:53)  HR: 85 (08-08-23 @ 20:29)  BP: 122/66 (08-08-23 @ 20:27)  RR: 14 (08-08-23 @ 16:53)  SpO2: 99% (08-08-23 @ 20:29)

## 2023-08-10 RX ADMIN — Medication 600 MILLIGRAM(S): at 18:15

## 2023-08-10 RX ADMIN — Medication 600 MILLIGRAM(S): at 06:42

## 2023-08-10 RX ADMIN — Medication 975 MILLIGRAM(S): at 21:09

## 2023-08-10 RX ADMIN — SODIUM CHLORIDE 3 MILLILITER(S): 9 INJECTION INTRAMUSCULAR; INTRAVENOUS; SUBCUTANEOUS at 06:42

## 2023-08-10 RX ADMIN — Medication 975 MILLIGRAM(S): at 20:38

## 2023-08-10 RX ADMIN — Medication 600 MILLIGRAM(S): at 17:29

## 2023-08-10 RX ADMIN — Medication 600 MILLIGRAM(S): at 06:05

## 2023-08-10 RX ADMIN — Medication 975 MILLIGRAM(S): at 09:13

## 2023-08-10 RX ADMIN — Medication 975 MILLIGRAM(S): at 08:36

## 2023-08-10 RX ADMIN — Medication 600 MILLIGRAM(S): at 12:10

## 2023-08-10 RX ADMIN — Medication 600 MILLIGRAM(S): at 23:28

## 2023-08-10 RX ADMIN — Medication 1 TABLET(S): at 12:10

## 2023-08-10 RX ADMIN — Medication 600 MILLIGRAM(S): at 12:45

## 2023-08-11 LAB
ALBUMIN SERPL ELPH-MCNC: 3.2 G/DL — LOW (ref 3.3–5)
ALP SERPL-CCNC: 136 U/L — HIGH (ref 40–120)
ALT FLD-CCNC: 32 U/L — SIGNIFICANT CHANGE UP (ref 4–33)
ANION GAP SERPL CALC-SCNC: 11 MMOL/L — SIGNIFICANT CHANGE UP (ref 7–14)
APTT BLD: 29.6 SEC — SIGNIFICANT CHANGE UP (ref 24.5–35.6)
AST SERPL-CCNC: 48 U/L — HIGH (ref 4–32)
BASOPHILS # BLD AUTO: 0.03 K/UL — SIGNIFICANT CHANGE UP (ref 0–0.2)
BASOPHILS NFR BLD AUTO: 0.3 % — SIGNIFICANT CHANGE UP (ref 0–2)
BILIRUB SERPL-MCNC: <0.2 MG/DL — SIGNIFICANT CHANGE UP (ref 0.2–1.2)
BUN SERPL-MCNC: 9 MG/DL — SIGNIFICANT CHANGE UP (ref 7–23)
CALCIUM SERPL-MCNC: 8.7 MG/DL — SIGNIFICANT CHANGE UP (ref 8.4–10.5)
CHLORIDE SERPL-SCNC: 106 MMOL/L — SIGNIFICANT CHANGE UP (ref 98–107)
CO2 SERPL-SCNC: 23 MMOL/L — SIGNIFICANT CHANGE UP (ref 22–31)
CREAT SERPL-MCNC: 0.43 MG/DL — LOW (ref 0.5–1.3)
EGFR: 129 ML/MIN/1.73M2 — SIGNIFICANT CHANGE UP
EOSINOPHIL # BLD AUTO: 0.15 K/UL — SIGNIFICANT CHANGE UP (ref 0–0.5)
EOSINOPHIL NFR BLD AUTO: 1.5 % — SIGNIFICANT CHANGE UP (ref 0–6)
FIBRINOGEN PPP-MCNC: 541 MG/DL — HIGH (ref 200–465)
GLUCOSE SERPL-MCNC: 84 MG/DL — SIGNIFICANT CHANGE UP (ref 70–99)
HCT VFR BLD CALC: 28 % — LOW (ref 34.5–45)
HGB BLD-MCNC: 8.7 G/DL — LOW (ref 11.5–15.5)
IANC: 7.68 K/UL — HIGH (ref 1.8–7.4)
IMM GRANULOCYTES NFR BLD AUTO: 0.9 % — SIGNIFICANT CHANGE UP (ref 0–0.9)
INR BLD: <0.9 RATIO — SIGNIFICANT CHANGE UP (ref 0.85–1.18)
LDH SERPL L TO P-CCNC: 242 U/L — HIGH (ref 135–225)
LYMPHOCYTES # BLD AUTO: 1.65 K/UL — SIGNIFICANT CHANGE UP (ref 1–3.3)
LYMPHOCYTES # BLD AUTO: 16.2 % — SIGNIFICANT CHANGE UP (ref 13–44)
MCHC RBC-ENTMCNC: 23.8 PG — LOW (ref 27–34)
MCHC RBC-ENTMCNC: 31.1 GM/DL — LOW (ref 32–36)
MCV RBC AUTO: 76.7 FL — LOW (ref 80–100)
MONOCYTES # BLD AUTO: 0.57 K/UL — SIGNIFICANT CHANGE UP (ref 0–0.9)
MONOCYTES NFR BLD AUTO: 5.6 % — SIGNIFICANT CHANGE UP (ref 2–14)
NEUTROPHILS # BLD AUTO: 7.68 K/UL — HIGH (ref 1.8–7.4)
NEUTROPHILS NFR BLD AUTO: 75.5 % — SIGNIFICANT CHANGE UP (ref 43–77)
NRBC # BLD: 0 /100 WBCS — SIGNIFICANT CHANGE UP (ref 0–0)
NRBC # FLD: 0 K/UL — SIGNIFICANT CHANGE UP (ref 0–0)
PLATELET # BLD AUTO: 314 K/UL — SIGNIFICANT CHANGE UP (ref 150–400)
POTASSIUM SERPL-MCNC: 3.8 MMOL/L — SIGNIFICANT CHANGE UP (ref 3.5–5.3)
POTASSIUM SERPL-SCNC: 3.8 MMOL/L — SIGNIFICANT CHANGE UP (ref 3.5–5.3)
PROT SERPL-MCNC: 6.5 G/DL — SIGNIFICANT CHANGE UP (ref 6–8.3)
PROTHROM AB SERPL-ACNC: 10.1 SEC — SIGNIFICANT CHANGE UP (ref 9.5–13)
RBC # BLD: 3.65 M/UL — LOW (ref 3.8–5.2)
RBC # FLD: 16 % — HIGH (ref 10.3–14.5)
SODIUM SERPL-SCNC: 140 MMOL/L — SIGNIFICANT CHANGE UP (ref 135–145)
URATE SERPL-MCNC: 3.4 MG/DL — SIGNIFICANT CHANGE UP (ref 2.5–7)
WBC # BLD: 10.17 K/UL — SIGNIFICANT CHANGE UP (ref 3.8–10.5)
WBC # FLD AUTO: 10.17 K/UL — SIGNIFICANT CHANGE UP (ref 3.8–10.5)

## 2023-08-11 PROCEDURE — 71275 CT ANGIOGRAPHY CHEST: CPT | Mod: 26

## 2023-08-11 PROCEDURE — 93970 EXTREMITY STUDY: CPT | Mod: 26

## 2023-08-11 PROCEDURE — 93306 TTE W/DOPPLER COMPLETE: CPT | Mod: 26

## 2023-08-11 RX ORDER — FERROUS SULFATE 325(65) MG
325 TABLET ORAL THREE TIMES A DAY
Refills: 0 | Status: DISCONTINUED | OUTPATIENT
Start: 2023-08-11 | End: 2023-08-12

## 2023-08-11 RX ORDER — SENNA PLUS 8.6 MG/1
2 TABLET ORAL AT BEDTIME
Refills: 0 | Status: DISCONTINUED | OUTPATIENT
Start: 2023-08-11 | End: 2023-08-12

## 2023-08-11 RX ORDER — ASCORBIC ACID 60 MG
500 TABLET,CHEWABLE ORAL DAILY
Refills: 0 | Status: DISCONTINUED | OUTPATIENT
Start: 2023-08-11 | End: 2023-08-12

## 2023-08-11 RX ADMIN — Medication 600 MILLIGRAM(S): at 00:14

## 2023-08-11 RX ADMIN — Medication 600 MILLIGRAM(S): at 13:01

## 2023-08-11 RX ADMIN — Medication 600 MILLIGRAM(S): at 23:51

## 2023-08-11 RX ADMIN — Medication 975 MILLIGRAM(S): at 02:59

## 2023-08-11 RX ADMIN — Medication 600 MILLIGRAM(S): at 13:35

## 2023-08-11 RX ADMIN — Medication 600 MILLIGRAM(S): at 18:56

## 2023-08-11 RX ADMIN — Medication 1 TABLET(S): at 13:01

## 2023-08-11 RX ADMIN — Medication 975 MILLIGRAM(S): at 03:25

## 2023-08-11 RX ADMIN — Medication 600 MILLIGRAM(S): at 18:20

## 2023-08-11 RX ADMIN — Medication 600 MILLIGRAM(S): at 06:03

## 2023-08-11 RX ADMIN — Medication 975 MILLIGRAM(S): at 21:43

## 2023-08-11 RX ADMIN — Medication 975 MILLIGRAM(S): at 10:15

## 2023-08-11 RX ADMIN — Medication 1 SPRAY(S): at 23:50

## 2023-08-11 RX ADMIN — SENNA PLUS 2 TABLET(S): 8.6 TABLET ORAL at 23:51

## 2023-08-11 RX ADMIN — Medication 975 MILLIGRAM(S): at 21:13

## 2023-08-11 RX ADMIN — Medication 325 MILLIGRAM(S): at 23:51

## 2023-08-11 RX ADMIN — SODIUM CHLORIDE 3 MILLILITER(S): 9 INJECTION INTRAMUSCULAR; INTRAVENOUS; SUBCUTANEOUS at 16:32

## 2023-08-11 RX ADMIN — Medication 975 MILLIGRAM(S): at 16:00

## 2023-08-11 RX ADMIN — MAGNESIUM HYDROXIDE 30 MILLILITER(S): 400 TABLET, CHEWABLE ORAL at 09:55

## 2023-08-11 RX ADMIN — Medication 975 MILLIGRAM(S): at 09:42

## 2023-08-11 RX ADMIN — Medication 975 MILLIGRAM(S): at 15:16

## 2023-08-11 NOTE — CHART NOTE - NSCHARTNOTEFT_GEN_A_CORE
Notified by RN of patient's c/o SOB. S/p uncomplicated  on , QBL 121ml. Patient reports currently experiencing shortness of breath with standing and ambulation today. She attempted to ambulate down hallway of unit, but was unable to walk a long distance due to becoming very winded after ~30 feet, and had to return to room. Patient also admits to feeling SOB yesterday but remained in bed for majority of day, with minimal ambulation and never leaving her room on PPD#1. She denies any SOB currently while resting in bed. Denies palpitations, CP, dizziness, lightheadedness, pain with inspiration, or respiratory history.    Vital Signs Last 24 Hrs  T(C): 36.7 (11 Aug 2023 06:00), Max: 36.8 (10 Aug 2023 18:26)  T(F): 98.1 (11 Aug 2023 06:00), Max: 98.3 (10 Aug 2023 18:26)  HR: 56 (11 Aug 2023 06:00) (56 - 60)  BP: 96/68 (11 Aug 2023 06:00) (96/68 - 107/69)  RR: 20 (11 Aug 2023 11:20) (18 - 20)  SpO2: 100% (11 Aug 2023 11:20) (100% - 100%)    Physical Exam  Gen: NAD, A&Ox3  Cardiac: RRR, normal S1, S2  Pulm: CTA b/l  Abdomen: soft, nontender, nondistended  Vaginal: Lochia scant  Lower Extremities: warm to touch, no pitting edema, equal in size    A/P:  37 y.o PPD#2 s/p , now with c/o SOB    -No signs of PE on physical exam  -O2sat 100% on room air  -Discussed with Dr. Morales  -For Lower extremity b/l dopplers, TTE, and CT angio of chest for PE workup  -CBC, Coags, and CMP ordered  -Discussed plan of care with patient who verbalizes understanding and agrees    d/w Dr. Andrew Fernández [facility] Notified by RN of patient's c/o SOB. S/p uncomplicated  on , QBL 121ml. Patient reports currently experiencing shortness of breath with standing and ambulation today. She attempted to ambulate down hallway of unit, but was unable to walk a long distance due to becoming very winded after ~30 feet, and had to return to room. Patient also admits to feeling SOB yesterday but remained in bed for majority of day, with minimal ambulation and never leaving her room on PPD#1. She denies any SOB currently while resting in bed. Denies palpitations, CP, dizziness, lightheadedness, pain with inspiration, or respiratory history.    Vital Signs Last 24 Hrs  T(C): 36.7 (11 Aug 2023 06:00), Max: 36.8 (10 Aug 2023 18:26)  T(F): 98.1 (11 Aug 2023 06:00), Max: 98.3 (10 Aug 2023 18:26)  HR: 56 (11 Aug 2023 06:00) (56 - 60)  BP: 96/68 (11 Aug 2023 06:00) (96/68 - 107/69)  RR: 20 (11 Aug 2023 11:20) (18 - 20)  SpO2: 100% (11 Aug 2023 11:20) (100% - 100%)    Physical Exam  Gen: NAD, A&Ox3  Cardiac: RRR, normal S1, S2  Pulm: CTA b/l  Abdomen: soft, nontender, nondistended  Vaginal: Lochia scant  Lower Extremities: warm to touch, no pitting edema, equal in size    A/P:  37 y.o PPD#2 s/p , now with c/o SOB    -No signs of PE on physical exam  -O2sat 100% on room air  -Discussed with Dr. Morales  -For Lower extremity b/l dopplers, TTE, and CT angio of chest for PE workup  -CBC, Coags, and CMP ordered  -Discussed plan of care with patient who verbalizes understanding and agrees    d/w Dr. Andrew Fernández [facility]    Attending Ntoe   Attempted ot see pt three times, no in room  Plan of care discussed with      SANDY Morales MD

## 2023-08-12 VITALS
DIASTOLIC BLOOD PRESSURE: 62 MMHG | OXYGEN SATURATION: 100 % | SYSTOLIC BLOOD PRESSURE: 100 MMHG | RESPIRATION RATE: 18 BRPM | TEMPERATURE: 98 F | HEART RATE: 64 BPM

## 2023-08-12 LAB
ALBUMIN SERPL ELPH-MCNC: 2.8 G/DL — LOW (ref 3.3–5)
ALP SERPL-CCNC: 116 U/L — SIGNIFICANT CHANGE UP (ref 40–120)
ALT FLD-CCNC: 35 U/L — HIGH (ref 4–33)
ANION GAP SERPL CALC-SCNC: 10 MMOL/L — SIGNIFICANT CHANGE UP (ref 7–14)
APTT BLD: 27.9 SEC — SIGNIFICANT CHANGE UP (ref 24.5–35.6)
AST SERPL-CCNC: 41 U/L — HIGH (ref 4–32)
BASOPHILS # BLD AUTO: 0.03 K/UL — SIGNIFICANT CHANGE UP (ref 0–0.2)
BASOPHILS NFR BLD AUTO: 0.3 % — SIGNIFICANT CHANGE UP (ref 0–2)
BILIRUB SERPL-MCNC: <0.2 MG/DL — SIGNIFICANT CHANGE UP (ref 0.2–1.2)
BUN SERPL-MCNC: 11 MG/DL — SIGNIFICANT CHANGE UP (ref 7–23)
CALCIUM SERPL-MCNC: 8.3 MG/DL — LOW (ref 8.4–10.5)
CHLORIDE SERPL-SCNC: 107 MMOL/L — SIGNIFICANT CHANGE UP (ref 98–107)
CO2 SERPL-SCNC: 23 MMOL/L — SIGNIFICANT CHANGE UP (ref 22–31)
CREAT SERPL-MCNC: 0.45 MG/DL — LOW (ref 0.5–1.3)
EGFR: 128 ML/MIN/1.73M2 — SIGNIFICANT CHANGE UP
EOSINOPHIL # BLD AUTO: 0.26 K/UL — SIGNIFICANT CHANGE UP (ref 0–0.5)
EOSINOPHIL NFR BLD AUTO: 2.8 % — SIGNIFICANT CHANGE UP (ref 0–6)
FIBRINOGEN PPP-MCNC: 572 MG/DL — HIGH (ref 200–465)
GLUCOSE SERPL-MCNC: 72 MG/DL — SIGNIFICANT CHANGE UP (ref 70–99)
HCT VFR BLD CALC: 26.7 % — LOW (ref 34.5–45)
HGB BLD-MCNC: 8.3 G/DL — LOW (ref 11.5–15.5)
IANC: 6.43 K/UL — SIGNIFICANT CHANGE UP (ref 1.8–7.4)
IMM GRANULOCYTES NFR BLD AUTO: 1.1 % — HIGH (ref 0–0.9)
INR BLD: <0.9 RATIO — SIGNIFICANT CHANGE UP (ref 0.85–1.18)
LDH SERPL L TO P-CCNC: 229 U/L — HIGH (ref 135–225)
LYMPHOCYTES # BLD AUTO: 2.14 K/UL — SIGNIFICANT CHANGE UP (ref 1–3.3)
LYMPHOCYTES # BLD AUTO: 22.7 % — SIGNIFICANT CHANGE UP (ref 13–44)
MCHC RBC-ENTMCNC: 24.1 PG — LOW (ref 27–34)
MCHC RBC-ENTMCNC: 31.1 GM/DL — LOW (ref 32–36)
MCV RBC AUTO: 77.4 FL — LOW (ref 80–100)
MONOCYTES # BLD AUTO: 0.47 K/UL — SIGNIFICANT CHANGE UP (ref 0–0.9)
MONOCYTES NFR BLD AUTO: 5 % — SIGNIFICANT CHANGE UP (ref 2–14)
NEUTROPHILS # BLD AUTO: 6.43 K/UL — SIGNIFICANT CHANGE UP (ref 1.8–7.4)
NEUTROPHILS NFR BLD AUTO: 68.1 % — SIGNIFICANT CHANGE UP (ref 43–77)
NRBC # BLD: 0 /100 WBCS — SIGNIFICANT CHANGE UP (ref 0–0)
NRBC # FLD: 0.02 K/UL — HIGH (ref 0–0)
PLATELET # BLD AUTO: 299 K/UL — SIGNIFICANT CHANGE UP (ref 150–400)
POTASSIUM SERPL-MCNC: 3.8 MMOL/L — SIGNIFICANT CHANGE UP (ref 3.5–5.3)
POTASSIUM SERPL-SCNC: 3.8 MMOL/L — SIGNIFICANT CHANGE UP (ref 3.5–5.3)
PROT SERPL-MCNC: 5.8 G/DL — LOW (ref 6–8.3)
PROTHROM AB SERPL-ACNC: 10 SEC — SIGNIFICANT CHANGE UP (ref 9.5–13)
RBC # BLD: 3.45 M/UL — LOW (ref 3.8–5.2)
RBC # FLD: 16 % — HIGH (ref 10.3–14.5)
SODIUM SERPL-SCNC: 140 MMOL/L — SIGNIFICANT CHANGE UP (ref 135–145)
URATE SERPL-MCNC: 3.1 MG/DL — SIGNIFICANT CHANGE UP (ref 2.5–7)
WBC # BLD: 9.43 K/UL — SIGNIFICANT CHANGE UP (ref 3.8–10.5)
WBC # FLD AUTO: 9.43 K/UL — SIGNIFICANT CHANGE UP (ref 3.8–10.5)

## 2023-08-12 RX ORDER — IBUPROFEN 200 MG
1 TABLET ORAL
Qty: 0 | Refills: 0 | DISCHARGE
Start: 2023-08-12

## 2023-08-12 RX ORDER — ACETAMINOPHEN 500 MG
3 TABLET ORAL
Qty: 0 | Refills: 0 | DISCHARGE
Start: 2023-08-12

## 2023-08-12 RX ADMIN — Medication 325 MILLIGRAM(S): at 06:19

## 2023-08-12 RX ADMIN — Medication 600 MILLIGRAM(S): at 13:35

## 2023-08-12 RX ADMIN — Medication 600 MILLIGRAM(S): at 00:35

## 2023-08-12 RX ADMIN — Medication 600 MILLIGRAM(S): at 07:00

## 2023-08-12 RX ADMIN — SODIUM CHLORIDE 3 MILLILITER(S): 9 INJECTION INTRAMUSCULAR; INTRAVENOUS; SUBCUTANEOUS at 00:00

## 2023-08-12 RX ADMIN — SODIUM CHLORIDE 3 MILLILITER(S): 9 INJECTION INTRAMUSCULAR; INTRAVENOUS; SUBCUTANEOUS at 06:00

## 2023-08-12 RX ADMIN — Medication 975 MILLIGRAM(S): at 14:45

## 2023-08-12 RX ADMIN — Medication 975 MILLIGRAM(S): at 10:40

## 2023-08-12 RX ADMIN — Medication 975 MILLIGRAM(S): at 10:07

## 2023-08-12 RX ADMIN — Medication 0.5 MILLILITER(S): at 14:18

## 2023-08-12 RX ADMIN — Medication 600 MILLIGRAM(S): at 06:19

## 2023-08-12 RX ADMIN — Medication 975 MILLIGRAM(S): at 15:30

## 2023-08-12 RX ADMIN — Medication 600 MILLIGRAM(S): at 13:04

## 2023-08-12 NOTE — PROGRESS NOTE ADULT - ASSESSMENT
A/P: 38yo PPD#3 s/p .  Patient is stable and doing well post-partum.     #Postpartum   - Pain well controlled, continue current pain regimen  - Increase ambulation, SCDs when not ambulating  - Continue regular diet  - CTM sxs SOB and [id number]  - Discharge planning     Antoenlla Ramos MD PGY1

## 2023-08-12 NOTE — PROGRESS NOTE ADULT - SUBJECTIVE AND OBJECTIVE BOX
Attending note   Late Entry   patient seen and evaluated on 8/10/23     S: Patient doing well. Minimal lochia. Pain controlled.    O: Vital Signs Last 24 Hrs  T(C): 36.7 (11 Aug 2023 06:00), Max: 36.8 (10 Aug 2023 18:26)  T(F): 98.1 (11 Aug 2023 06:00), Max: 98.3 (10 Aug 2023 18:26)  HR: 56 (11 Aug 2023 06:00) (56 - 60)  BP: 96/68 (11 Aug 2023 06:00) (96/68 - 107/69)  BP(mean): --  RR: 18 (11 Aug 2023 06:00) (18 - 19)  SpO2: 100% (11 Aug 2023 06:00) (100% - 100%)        Gen: NAD  Abd: soft, NT, ND, fundus firm below umbilicus  Lochia: moderate  Ext: no tenderness    Labs:      A: 37y PPD#1 s/p  doing well.    Plan: continue pp care for discharge planning for elaine Husain
OB Progress Note:  PPD#3    S: 37yyo PPD#3 s/p  c/b SOB on PPD#2 with negative CTA, Dopplers, echo. Patient feels well. Pain is well controlled. She is tolerating a regular diet and passing flatus. She is voiding spontaneously, and ambulating without difficulty. Denies CP/SOB. Denies lightheadedness/dizziness. Denies N/V.    O:  Vitals:   Vital Signs Last 24 Hrs  T(C): 36.6 (11 Aug 2023 18:00), Max: 36.6 (11 Aug 2023 18:00)  T(F): 97.9 (11 Aug 2023 18:00), Max: 97.9 (11 Aug 2023 18:00)  HR: 58 (11 Aug 2023 18:00) (58 - 58)  BP: 103/59 (11 Aug 2023 18:00) (103/59 - 103/59)  BP(mean): --  RR: 20 (11 Aug 2023 18:00) (20 - 20)  SpO2: 100% (11 Aug 2023 18:00) (100% - 100%)        MEDICATIONS  (STANDING):  acetaminophen     Tablet .. 975 milliGRAM(s) Oral <User Schedule>  ascorbic acid 500 milliGRAM(s) Oral daily  diphtheria/tetanus/pertussis (acellular) Vaccine (Adacel) 0.5 milliLiter(s) IntraMuscular once  ferrous    sulfate 325 milliGRAM(s) Oral three times a day  ibuprofen  Tablet. 600 milliGRAM(s) Oral every 6 hours  prenatal multivitamin 1 Tablet(s) Oral daily  senna 2 Tablet(s) Oral at bedtime  sodium chloride 0.9% lock flush 3 milliLiter(s) IV Push every 8 hours    MEDICATIONS  (PRN):  benzocaine 20%/menthol 0.5% Spray 1 Spray(s) Topical every 6 hours PRN for Perineal discomfort  dibucaine 1% Ointment 1 Application(s) Topical every 6 hours PRN Perineal discomfort  diphenhydrAMINE 25 milliGRAM(s) Oral every 6 hours PRN Pruritus  hydrocortisone 1% Cream 1 Application(s) Topical every 6 hours PRN Moderate Pain (4-6)  lanolin Ointment 1 Application(s) Topical every 6 hours PRN nipple soreness  magnesium hydroxide Suspension 30 milliLiter(s) Oral two times a day PRN Constipation  oxyCODONE    IR 5 milliGRAM(s) Oral every 3 hours PRN Moderate to Severe Pain (4-10)  oxyCODONE    IR 5 milliGRAM(s) Oral once PRN Moderate to Severe Pain (4-10)  pramoxine 1%/zinc 5% Cream 1 Application(s) Topical every 4 hours PRN Moderate Pain (4-6)  simethicone 80 milliGRAM(s) Chew every 4 hours PRN Gas  witch hazel Pads 1 Application(s) Topical every 4 hours PRN Perineal discomfort      Labs:  Blood type: A Positive  Rubella IgG: RPR: Negative                          8.7<L>   10.17 >-----------< 314    (  @ 12:50 )             28.0<L>    23 @ 12:50      140  |  106  |  9   ----------------------------<  84  3.8   |  23  |  0.43<L>        Ca    8.7      11 Aug 2023 12:50    TPro  6.5  /  Alb  3.2<L>  /  TBili  <0.2  /  DBili  x   /  AST  48<H>  /  ALT  32  /  AlkPhos  136<H>  23 @ 12:50          Physical Exam:  General: NAD  Abdomen: soft, non-tender, non-distended, fundus firm  Vaginal: Lochia wnl  Extremities: No erythema/edema

## 2023-08-12 NOTE — PROGRESS NOTE ADULT - ATTENDING COMMENTS
OBGYN Attending    Pt seen at bedside.  Agree with above.  Doing well PPD#1.  Symptom of SOB improved, pt currently denies symptoms.  Pain controlled.  Tolerating PO.  Voiding.  Ambulating.  Abd soft, NT.  FF, NT.  LE without significant edema.  Discussed negative findings of work-up from yesterday including normal TTE, CTA negative for PE, LE dopplers negative for DVT.  All questions answered.  Routine PP care.  Discharge planning.    SUSY Moreno MD

## 2023-08-23 PROBLEM — Z86.59 PERSONAL HISTORY OF OTHER MENTAL AND BEHAVIORAL DISORDERS: Chronic | Status: ACTIVE | Noted: 2023-08-08

## 2023-08-29 ENCOUNTER — APPOINTMENT (OUTPATIENT)
Age: 38
End: 2023-08-29
Payer: COMMERCIAL

## 2023-08-29 PROBLEM — Z00.00 ENCOUNTER FOR PREVENTIVE HEALTH EXAMINATION: Status: ACTIVE | Noted: 2023-08-29

## 2023-08-29 PROCEDURE — S9443: CPT | Mod: 95

## 2025-02-04 NOTE — OB RN TRIAGE NOTE - NS_MODEOFARRIVAL_OBGYN_ALL_OB
Anesthesia confirms case reviewed for anesthesia risk alert.
Care of the patient is governed by the Department of Anesthesia policy and procedure manual.
Anesthesia confirms case reviewed for anesthesia risk alert.
Car

## 2025-08-14 ENCOUNTER — INPATIENT (INPATIENT)
Facility: HOSPITAL | Age: 40
LOS: 0 days | Discharge: HOME | End: 2025-08-15
Attending: OBSTETRICS & GYNECOLOGY | Admitting: OBSTETRICS & GYNECOLOGY

## 2025-08-14 ENCOUNTER — APPOINTMENT (OUTPATIENT)
Dept: ANTEPARTUM | Facility: CLINIC | Age: 40
End: 2025-08-14

## 2025-08-14 VITALS — RESPIRATION RATE: 16 BRPM | TEMPERATURE: 98 F

## 2025-08-14 DIAGNOSIS — O26.899 OTHER SPECIFIED PREGNANCY RELATED CONDITIONS, UNSPECIFIED TRIMESTER: ICD-10-CM

## 2025-08-14 LAB
BASOPHILS # BLD AUTO: 0.04 K/UL — SIGNIFICANT CHANGE UP (ref 0–0.2)
BASOPHILS NFR BLD AUTO: 0.3 % — SIGNIFICANT CHANGE UP (ref 0–2)
BLD GP AB SCN SERPL QL: NEGATIVE — SIGNIFICANT CHANGE UP
EOSINOPHIL # BLD AUTO: 0.19 K/UL — SIGNIFICANT CHANGE UP (ref 0–0.5)
EOSINOPHIL NFR BLD AUTO: 1.3 % — SIGNIFICANT CHANGE UP (ref 0–6)
HCT VFR BLD CALC: 39.7 % — SIGNIFICANT CHANGE UP (ref 34.5–45)
HGB BLD-MCNC: 12.4 G/DL — SIGNIFICANT CHANGE UP (ref 11.5–15.5)
IMM GRANULOCYTES # BLD AUTO: 0.08 K/UL — HIGH (ref 0–0.07)
IMM GRANULOCYTES NFR BLD AUTO: 0.5 % — SIGNIFICANT CHANGE UP (ref 0–0.9)
LYMPHOCYTES # BLD AUTO: 2.94 K/UL — SIGNIFICANT CHANGE UP (ref 1–3.3)
LYMPHOCYTES NFR BLD AUTO: 20.2 % — SIGNIFICANT CHANGE UP (ref 13–44)
MCHC RBC-ENTMCNC: 25.9 PG — LOW (ref 27–34)
MCHC RBC-ENTMCNC: 31.2 G/DL — LOW (ref 32–36)
MCV RBC AUTO: 83.1 FL — SIGNIFICANT CHANGE UP (ref 80–100)
MONOCYTES # BLD AUTO: 0.69 K/UL — SIGNIFICANT CHANGE UP (ref 0–0.9)
MONOCYTES NFR BLD AUTO: 4.7 % — SIGNIFICANT CHANGE UP (ref 2–14)
NEUTROPHILS # BLD AUTO: 10.61 K/UL — HIGH (ref 1.8–7.4)
NEUTROPHILS NFR BLD AUTO: 73 % — SIGNIFICANT CHANGE UP (ref 43–77)
NRBC # BLD AUTO: 0 K/UL — SIGNIFICANT CHANGE UP (ref 0–0)
NRBC # FLD: 0 K/UL — SIGNIFICANT CHANGE UP (ref 0–0)
NRBC BLD AUTO-RTO: 0 /100 WBCS — SIGNIFICANT CHANGE UP (ref 0–0)
PLATELET # BLD AUTO: 377 K/UL — SIGNIFICANT CHANGE UP (ref 150–400)
PMV BLD: 11.8 FL — SIGNIFICANT CHANGE UP (ref 7–13)
RBC # BLD: 4.78 M/UL — SIGNIFICANT CHANGE UP (ref 3.8–5.2)
RBC # FLD: 17.1 % — HIGH (ref 10.3–14.5)
RH IG SCN BLD-IMP: POSITIVE — SIGNIFICANT CHANGE UP
WBC # BLD: 14.55 K/UL — HIGH (ref 3.8–10.5)
WBC # FLD AUTO: 14.55 K/UL — HIGH (ref 3.8–10.5)

## 2025-08-14 RX ORDER — IBUPROFEN 200 MG
600 TABLET ORAL EVERY 6 HOURS
Refills: 0 | Status: DISCONTINUED | OUTPATIENT
Start: 2025-08-14 | End: 2025-08-15

## 2025-08-14 RX ORDER — ACETAMINOPHEN 500 MG/5ML
975 LIQUID (ML) ORAL
Refills: 0 | Status: DISCONTINUED | OUTPATIENT
Start: 2025-08-14 | End: 2025-08-14

## 2025-08-14 RX ORDER — MODIFIED LANOLIN 100 %
1 CREAM (GRAM) TOPICAL EVERY 6 HOURS
Refills: 0 | Status: DISCONTINUED | OUTPATIENT
Start: 2025-08-14 | End: 2025-08-15

## 2025-08-14 RX ORDER — DIPHENHYDRAMINE HCL 12.5MG/5ML
25 ELIXIR ORAL EVERY 6 HOURS
Refills: 0 | Status: DISCONTINUED | OUTPATIENT
Start: 2025-08-14 | End: 2025-08-15

## 2025-08-14 RX ORDER — KETOROLAC TROMETHAMINE 30 MG/ML
30 INJECTION, SOLUTION INTRAMUSCULAR; INTRAVENOUS ONCE
Refills: 0 | Status: DISCONTINUED | OUTPATIENT
Start: 2025-08-14 | End: 2025-08-15

## 2025-08-14 RX ORDER — SIMETHICONE 80 MG
80 TABLET,CHEWABLE ORAL EVERY 4 HOURS
Refills: 0 | Status: DISCONTINUED | OUTPATIENT
Start: 2025-08-14 | End: 2025-08-14

## 2025-08-14 RX ORDER — KETOROLAC TROMETHAMINE 30 MG/ML
30 INJECTION, SOLUTION INTRAMUSCULAR; INTRAVENOUS ONCE
Refills: 0 | Status: DISCONTINUED | OUTPATIENT
Start: 2025-08-14 | End: 2025-08-14

## 2025-08-14 RX ORDER — ACETAMINOPHEN 500 MG/5ML
975 LIQUID (ML) ORAL
Refills: 0 | Status: DISCONTINUED | OUTPATIENT
Start: 2025-08-14 | End: 2025-08-15

## 2025-08-14 RX ORDER — BENZOCAINE 220 MG/G
1 SPRAY, METERED PERIODONTAL EVERY 6 HOURS
Refills: 0 | Status: DISCONTINUED | OUTPATIENT
Start: 2025-08-14 | End: 2025-08-15

## 2025-08-14 RX ORDER — OXYTOCIN-SODIUM CHLORIDE 0.9% IV SOLN 30 UNIT/500ML 30-0.9/5 UT/ML-%
20 SOLUTION INTRAVENOUS ONCE
Refills: 0 | Status: COMPLETED | OUTPATIENT
Start: 2025-08-14 | End: 2025-08-14

## 2025-08-14 RX ORDER — OXYTOCIN-SODIUM CHLORIDE 0.9% IV SOLN 30 UNIT/500ML 30-0.9/5 UT/ML-%
167 SOLUTION INTRAVENOUS
Qty: 30 | Refills: 0 | Status: DISCONTINUED | OUTPATIENT
Start: 2025-08-14 | End: 2025-08-15

## 2025-08-14 RX ORDER — OXYTOCIN-SODIUM CHLORIDE 0.9% IV SOLN 30 UNIT/500ML 30-0.9/5 UT/ML-%
167 SOLUTION INTRAVENOUS
Qty: 30 | Refills: 0 | Status: DISCONTINUED | OUTPATIENT
Start: 2025-08-14 | End: 2025-08-14

## 2025-08-14 RX ORDER — WITCH HAZEL LEAF
1 FLUID EXTRACT MISCELLANEOUS EVERY 4 HOURS
Refills: 0 | Status: DISCONTINUED | OUTPATIENT
Start: 2025-08-14 | End: 2025-08-15

## 2025-08-14 RX ORDER — OXYCODONE HYDROCHLORIDE 30 MG/1
5 TABLET ORAL
Refills: 0 | Status: DISCONTINUED | OUTPATIENT
Start: 2025-08-14 | End: 2025-08-14

## 2025-08-14 RX ORDER — DIBUCAINE 10 MG/G
1 OINTMENT TOPICAL EVERY 6 HOURS
Refills: 0 | Status: DISCONTINUED | OUTPATIENT
Start: 2025-08-14 | End: 2025-08-15

## 2025-08-14 RX ORDER — HYDROCORTISONE 10 MG/G
1 CREAM TOPICAL EVERY 6 HOURS
Refills: 0 | Status: DISCONTINUED | OUTPATIENT
Start: 2025-08-14 | End: 2025-08-15

## 2025-08-14 RX ORDER — IBUPROFEN 200 MG
600 TABLET ORAL EVERY 6 HOURS
Refills: 0 | Status: COMPLETED | OUTPATIENT
Start: 2025-08-14 | End: 2026-07-13

## 2025-08-14 RX ORDER — SODIUM CHLORIDE 9 G/1000ML
1000 INJECTION, SOLUTION INTRAVENOUS
Refills: 0 | Status: DISCONTINUED | OUTPATIENT
Start: 2025-08-14 | End: 2025-08-14

## 2025-08-14 RX ORDER — OXYCODONE HYDROCHLORIDE 30 MG/1
5 TABLET ORAL ONCE
Refills: 0 | Status: DISCONTINUED | OUTPATIENT
Start: 2025-08-14 | End: 2025-08-15

## 2025-08-14 RX ORDER — PRENATAL 136/IRON/FOLIC ACID 27 MG-1 MG
1 TABLET ORAL DAILY
Refills: 0 | Status: DISCONTINUED | OUTPATIENT
Start: 2025-08-14 | End: 2025-08-15

## 2025-08-14 RX ORDER — SIMETHICONE 80 MG
80 TABLET,CHEWABLE ORAL EVERY 4 HOURS
Refills: 0 | Status: DISCONTINUED | OUTPATIENT
Start: 2025-08-14 | End: 2025-08-15

## 2025-08-14 RX ORDER — MAGNESIUM HYDROXIDE 400 MG/5ML
30 SUSPENSION ORAL
Refills: 0 | Status: DISCONTINUED | OUTPATIENT
Start: 2025-08-14 | End: 2025-08-14

## 2025-08-14 RX ORDER — PRAMOXINE HCL 1 %
1 GEL (GRAM) TOPICAL EVERY 4 HOURS
Refills: 0 | Status: DISCONTINUED | OUTPATIENT
Start: 2025-08-14 | End: 2025-08-15

## 2025-08-14 RX ORDER — OXYCODONE HYDROCHLORIDE 30 MG/1
5 TABLET ORAL
Refills: 0 | Status: DISCONTINUED | OUTPATIENT
Start: 2025-08-14 | End: 2025-08-15

## 2025-08-14 RX ORDER — ACETAMINOPHEN 500 MG/5ML
1000 LIQUID (ML) ORAL ONCE
Refills: 0 | Status: COMPLETED | OUTPATIENT
Start: 2025-08-14 | End: 2025-08-14

## 2025-08-14 RX ORDER — DIBUCAINE 10 MG/G
1 OINTMENT TOPICAL EVERY 6 HOURS
Refills: 0 | Status: DISCONTINUED | OUTPATIENT
Start: 2025-08-14 | End: 2025-08-14

## 2025-08-14 RX ORDER — MAGNESIUM HYDROXIDE 400 MG/5ML
30 SUSPENSION ORAL
Refills: 0 | Status: DISCONTINUED | OUTPATIENT
Start: 2025-08-14 | End: 2025-08-15

## 2025-08-14 RX ADMIN — OXYTOCIN-SODIUM CHLORIDE 0.9% IV SOLN 30 UNIT/500ML 20 UNIT(S): 30-0.9/5 SOLUTION at 13:15

## 2025-08-14 RX ADMIN — Medication 3 MILLILITER(S): at 16:20

## 2025-08-14 RX ADMIN — Medication 975 MILLIGRAM(S): at 21:17

## 2025-08-14 RX ADMIN — OXYTOCIN-SODIUM CHLORIDE 0.9% IV SOLN 30 UNIT/500ML 167 MILLIUNIT(S)/MIN: 30-0.9/5 SOLUTION at 14:27

## 2025-08-14 RX ADMIN — Medication 600 MILLIGRAM(S): at 18:11

## 2025-08-14 RX ADMIN — Medication 600 MILLIGRAM(S): at 18:40

## 2025-08-14 RX ADMIN — Medication 400 MILLIGRAM(S): at 14:30

## 2025-08-14 RX ADMIN — Medication 975 MILLIGRAM(S): at 20:47

## 2025-08-14 RX ADMIN — Medication 3 MILLILITER(S): at 21:17

## 2025-08-14 RX ADMIN — Medication 1000 MILLIGRAM(S): at 16:20

## 2025-08-15 ENCOUNTER — TRANSCRIPTION ENCOUNTER (OUTPATIENT)
Age: 40
End: 2025-08-15

## 2025-08-15 VITALS
OXYGEN SATURATION: 100 % | HEART RATE: 66 BPM | SYSTOLIC BLOOD PRESSURE: 110 MMHG | DIASTOLIC BLOOD PRESSURE: 64 MMHG | TEMPERATURE: 98 F | RESPIRATION RATE: 18 BRPM

## 2025-08-15 LAB
HCT VFR BLD CALC: 31.3 % — LOW (ref 34.5–45)
HGB BLD-MCNC: 10.1 G/DL — LOW (ref 11.5–15.5)
MCHC RBC-ENTMCNC: 26.2 PG — LOW (ref 27–34)
MCHC RBC-ENTMCNC: 32.3 G/DL — SIGNIFICANT CHANGE UP (ref 32–36)
MCV RBC AUTO: 81.3 FL — SIGNIFICANT CHANGE UP (ref 80–100)
NRBC # BLD AUTO: 0 K/UL — SIGNIFICANT CHANGE UP (ref 0–0)
NRBC # FLD: 0 K/UL — SIGNIFICANT CHANGE UP (ref 0–0)
NRBC BLD AUTO-RTO: 0 /100 WBCS — SIGNIFICANT CHANGE UP (ref 0–0)
PLATELET # BLD AUTO: 255 K/UL — SIGNIFICANT CHANGE UP (ref 150–400)
PMV BLD: 11.4 FL — SIGNIFICANT CHANGE UP (ref 7–13)
RBC # BLD: 3.85 M/UL — SIGNIFICANT CHANGE UP (ref 3.8–5.2)
RBC # FLD: 17.2 % — HIGH (ref 10.3–14.5)
T PALLIDUM AB TITR SER: NEGATIVE — SIGNIFICANT CHANGE UP
WBC # BLD: 10.18 K/UL — SIGNIFICANT CHANGE UP (ref 3.8–10.5)
WBC # FLD AUTO: 10.18 K/UL — SIGNIFICANT CHANGE UP (ref 3.8–10.5)

## 2025-08-15 RX ORDER — IBUPROFEN 200 MG
1 TABLET ORAL
Qty: 0 | Refills: 0 | DISCHARGE
Start: 2025-08-15

## 2025-08-15 RX ORDER — ACETAMINOPHEN 500 MG/5ML
3 LIQUID (ML) ORAL
Qty: 0 | Refills: 0 | DISCHARGE
Start: 2025-08-15

## 2025-08-15 RX ADMIN — Medication 975 MILLIGRAM(S): at 10:10

## 2025-08-15 RX ADMIN — Medication 975 MILLIGRAM(S): at 16:20

## 2025-08-15 RX ADMIN — Medication 600 MILLIGRAM(S): at 06:07

## 2025-08-15 RX ADMIN — Medication 600 MILLIGRAM(S): at 00:06

## 2025-08-15 RX ADMIN — Medication 600 MILLIGRAM(S): at 11:36

## 2025-08-15 RX ADMIN — Medication 975 MILLIGRAM(S): at 15:24

## 2025-08-15 RX ADMIN — Medication 600 MILLIGRAM(S): at 06:37

## 2025-08-15 RX ADMIN — Medication 975 MILLIGRAM(S): at 03:50

## 2025-08-15 RX ADMIN — Medication 600 MILLIGRAM(S): at 00:36

## 2025-08-15 RX ADMIN — Medication 1 TABLET(S): at 11:36

## 2025-08-15 RX ADMIN — Medication 975 MILLIGRAM(S): at 09:13

## 2025-08-15 RX ADMIN — Medication 600 MILLIGRAM(S): at 17:20

## 2025-08-15 RX ADMIN — Medication 3 MILLILITER(S): at 06:37

## 2025-08-15 RX ADMIN — Medication 600 MILLIGRAM(S): at 12:25

## 2025-08-15 RX ADMIN — Medication 975 MILLIGRAM(S): at 03:20
